# Patient Record
Sex: MALE | Race: WHITE | NOT HISPANIC OR LATINO | URBAN - METROPOLITAN AREA
[De-identification: names, ages, dates, MRNs, and addresses within clinical notes are randomized per-mention and may not be internally consistent; named-entity substitution may affect disease eponyms.]

---

## 2023-03-27 ENCOUNTER — LAB OUTSIDE AN ENCOUNTER (OUTPATIENT)
Dept: URBAN - METROPOLITAN AREA CLINIC 72 | Facility: CLINIC | Age: 63
End: 2023-03-27

## 2023-03-27 ENCOUNTER — OFFICE VISIT (OUTPATIENT)
Dept: URBAN - METROPOLITAN AREA CLINIC 72 | Facility: CLINIC | Age: 63
End: 2023-03-27
Payer: COMMERCIAL

## 2023-03-27 ENCOUNTER — WEB ENCOUNTER (OUTPATIENT)
Dept: URBAN - METROPOLITAN AREA CLINIC 72 | Facility: CLINIC | Age: 63
End: 2023-03-27

## 2023-03-27 ENCOUNTER — DASHBOARD ENCOUNTERS (OUTPATIENT)
Age: 63
End: 2023-03-27

## 2023-03-27 VITALS
BODY MASS INDEX: 30.94 KG/M2 | WEIGHT: 221 LBS | SYSTOLIC BLOOD PRESSURE: 142 MMHG | DIASTOLIC BLOOD PRESSURE: 61 MMHG | TEMPERATURE: 97.2 F | HEIGHT: 71 IN | HEART RATE: 71 BPM

## 2023-03-27 DIAGNOSIS — K65.4 MESENTERIC PANNICULITIS: ICD-10-CM

## 2023-03-27 DIAGNOSIS — K76.89 BENIGN LIVER CYST: ICD-10-CM

## 2023-03-27 DIAGNOSIS — Z12.11 SCREENING FOR COLON CANCER: ICD-10-CM

## 2023-03-27 DIAGNOSIS — K42.9 UMBILICAL HERNIA WITHOUT OBSTRUCTION AND WITHOUT GANGRENE: ICD-10-CM

## 2023-03-27 PROBLEM — 85057007: Status: ACTIVE | Noted: 2023-03-27

## 2023-03-27 PROCEDURE — 99203 OFFICE O/P NEW LOW 30 MIN: CPT | Performed by: INTERNAL MEDICINE

## 2023-03-27 RX ORDER — ROSUVASTATIN CALCIUM 10 MG/1
TAKE 1 TABLET BY MOUTH EVERY DAY TABLET, FILM COATED ORAL
Qty: 90 EACH | Refills: 1 | Status: ACTIVE | COMMUNITY

## 2023-03-27 RX ORDER — TAMSULOSIN HYDROCHLORIDE 0.4 MG/1
CAPSULE ORAL
Qty: 180 CAPSULE | Status: ACTIVE | COMMUNITY

## 2023-03-27 RX ORDER — DUTASTERIDE 0.5 MG/1
CAPSULE, LIQUID FILLED ORAL
Qty: 90 CAPSULE | Status: ON HOLD | COMMUNITY

## 2023-03-27 NOTE — HPI-TODAY'S VISIT:
62-year-old male new to the clinic here for screening colonoscopy  Last and only colonoscopy was May 2013 which was normal.    On interview today bowel are moving regularly without melena or hematochezia.  Feels a little pain once in a while LLQ.  He thinks it is gas.  No pain currently  No CP, SOB, palpitations, syncope, near-syncope or problems with anesthesia previously.   CT Abdomen without contrast 5/15/2019.  Punctate hypodense lesions throughout inferior right hepatic lobe too small to characterize but likely represent cysts.  2.6 cm cyst lower pole left kidney.  Mildly enlarged prostate measuring 4.7 cm.  Redemonstration of large right hydrocele.  Tiny fat-containing umbilical hernia.  Haziness at root of mesentery where multiple prominent mesenteric lymph nodes are seen measuring up to 0.8 cm.  Findings likely represent mesenteric panniculitis.  It was recommended to repeat CT in 6 months to ensure resolution.

## 2023-04-28 ENCOUNTER — OFFICE VISIT (OUTPATIENT)
Dept: URBAN - METROPOLITAN AREA MEDICAL CENTER 40 | Facility: MEDICAL CENTER | Age: 63
End: 2023-04-28
Payer: COMMERCIAL

## 2023-04-28 DIAGNOSIS — Z12.11 SCREENING FOR COLON CANCER: ICD-10-CM

## 2023-04-28 DIAGNOSIS — D12.4 ADENOMA OF DESCENDING COLON: ICD-10-CM

## 2023-04-28 PROCEDURE — 45385 COLONOSCOPY W/LESION REMOVAL: CPT | Performed by: INTERNAL MEDICINE

## 2025-03-10 ENCOUNTER — TELEPHONE ENCOUNTER (OUTPATIENT)
Dept: URBAN - METROPOLITAN AREA CLINIC 72 | Facility: CLINIC | Age: 65
End: 2025-03-10

## 2025-03-12 ENCOUNTER — OFFICE VISIT (OUTPATIENT)
Dept: URBAN - METROPOLITAN AREA CLINIC 72 | Facility: CLINIC | Age: 65
End: 2025-03-12

## 2025-03-12 NOTE — HPI-OTHER HISTORIES
Procedures: 4/28/2023-colonoscopy: Minimal diverticulosis identified from sigmoid to descending colon.  Descending colon polyp x 2.  Path: 1 tubular adenoma and 1 hyperplastic polyp.  Patient due for colonoscopy recall in 7 years.  Repeat 4/2030.  DI: 5/15/2019-CT abdomen and pelvis without contrast: No evidence of diverticulitis.  Haziness at the root of the mesentery where multiple mildly prominent mesenteric lymph nodes are seen measuring up to 0.8 cm.  Findings likely represent mesenteric panniculitis.  Follow-up in 6 months suggested.  Stable large right hydrocele.  Distended urinary bladder to the level of the umbilicus likely secondary to the enlarged prostate.  Bladder volume 1046 cc.  A few punctate hypodense lesions throughout the inferior right hepatic lobe, which are too small to characterize, but likely represent cysts.

## 2025-03-12 NOTE — HPI-TODAY'S VISIT:
Patient is a 64-year-old male last seen in the office on 3/27/2023 by JOVANY Winters.  Patient had CT abdomen without contrast which showed liver lesions and likely mesenteric panniculitis.  Recommended patient follow-up in 6 months for repeat CT.  Patient was scheduled for colonoscopy.  That was performed on 4/28/2023.  No chief complaint listed for today's visit.

## 2025-04-11 ENCOUNTER — OFFICE VISIT (OUTPATIENT)
Dept: URBAN - METROPOLITAN AREA CLINIC 72 | Facility: CLINIC | Age: 65
End: 2025-04-11
Payer: MEDICARE

## 2025-04-11 DIAGNOSIS — K59.09 CHRONIC CONSTIPATION: ICD-10-CM

## 2025-04-11 DIAGNOSIS — Z86.0101 PERSONAL HISTORY OF ADENOMATOUS AND SERRATED COLON POLYPS: ICD-10-CM

## 2025-04-11 DIAGNOSIS — Z86.0102 PERSONAL HISTORY OF HYPERPLASTIC COLON POLYPS: ICD-10-CM

## 2025-04-11 PROBLEM — 428283002: Status: ACTIVE | Noted: 2025-04-11

## 2025-04-11 PROBLEM — 236069009: Status: ACTIVE | Noted: 2025-04-11

## 2025-04-11 PROBLEM — 285387005: Status: ACTIVE | Noted: 2025-04-11

## 2025-04-11 PROCEDURE — 99214 OFFICE O/P EST MOD 30 MIN: CPT

## 2025-04-11 RX ORDER — DUTASTERIDE 0.5 MG/1
CAPSULE, LIQUID FILLED ORAL
Qty: 90 CAPSULE | Status: ON HOLD | COMMUNITY

## 2025-04-11 RX ORDER — ROSUVASTATIN CALCIUM 10 MG/1
TAKE 1 TABLET BY MOUTH EVERY DAY TABLET, FILM COATED ORAL
Qty: 90 EACH | Refills: 1 | Status: ACTIVE | COMMUNITY

## 2025-04-11 RX ORDER — TAMSULOSIN HYDROCHLORIDE 0.4 MG/1
CAPSULE ORAL
Qty: 180 CAPSULE | Status: ACTIVE | COMMUNITY

## 2025-04-11 NOTE — HPI-OTHER HISTORIES
Procedures: 4/28/2023-colonoscopy: Minimal diverticulosis identified from sigmoid to descending colon. Descending colon polyp x 2. Path: Patient had 1 tubular adenoma and 1 hyperplastic polyp. Recall 7 years. Due 4/2030.  DI: 5/15/2019-CT abdomen and pelvis: No evidence of diverticulitis. Haziness of the root of the mesentery where multiple mildly prominent mesenteric lymph nodes are seen measuring up to 0.8 cm. Findings likely represent mesenteric panniculitis. Abdominal CT follow-up in 6 months is suggested to document resolution. Stable large, right hydrocele. Distended urinary bladder.

## 2025-04-11 NOTE — HPI-TODAY'S VISIT:
Patient is a 64-year-old male last seen in the office on 3/27/2023 for colon consult.  Patient reached out to the office on 3/10/2025 with complaints of a possible diverticulitis flare.  After speaking to patient, it did not sound like a diverticulitis flare as he was having left-sided abdominal pain that got better with bowel movements.  Patient was asked to start MiraLAX and fiber daily and he is here for follow-up.  Patient is seen today and he states that he will get intermittent LLQ pain. He gets bound up.

## 2025-04-28 ENCOUNTER — OFFICE VISIT (OUTPATIENT)
Dept: URBAN - METROPOLITAN AREA CLINIC 72 | Facility: CLINIC | Age: 65
End: 2025-04-28